# Patient Record
Sex: MALE | Race: WHITE | Employment: UNEMPLOYED
[De-identification: names, ages, dates, MRNs, and addresses within clinical notes are randomized per-mention and may not be internally consistent; named-entity substitution may affect disease eponyms.]

---

## 2017-12-17 ENCOUNTER — HEALTH MAINTENANCE LETTER (OUTPATIENT)
Age: 22
End: 2017-12-17

## 2024-11-03 ENCOUNTER — APPOINTMENT (OUTPATIENT)
Dept: RADIOLOGY | Facility: CLINIC | Age: 29
End: 2024-11-03
Payer: COMMERCIAL

## 2024-11-03 ENCOUNTER — OFFICE VISIT (OUTPATIENT)
Dept: URGENT CARE | Facility: CLINIC | Age: 29
End: 2024-11-03
Payer: COMMERCIAL

## 2024-11-03 VITALS
OXYGEN SATURATION: 98 % | WEIGHT: 154 LBS | RESPIRATION RATE: 20 BRPM | TEMPERATURE: 97.6 F | SYSTOLIC BLOOD PRESSURE: 153 MMHG | HEART RATE: 90 BPM | HEIGHT: 69 IN | BODY MASS INDEX: 22.81 KG/M2 | DIASTOLIC BLOOD PRESSURE: 100 MMHG

## 2024-11-03 DIAGNOSIS — S43.402A SPRAIN OF LEFT SHOULDER, UNSPECIFIED SHOULDER SPRAIN TYPE, INITIAL ENCOUNTER: ICD-10-CM

## 2024-11-03 DIAGNOSIS — M25.512 ACUTE PAIN OF LEFT SHOULDER: ICD-10-CM

## 2024-11-03 DIAGNOSIS — M25.512 ACUTE PAIN OF LEFT SHOULDER: Primary | ICD-10-CM

## 2024-11-03 PROCEDURE — G0382 LEV 3 HOSP TYPE B ED VISIT: HCPCS | Performed by: NURSE PRACTITIONER

## 2024-11-03 PROCEDURE — 73030 X-RAY EXAM OF SHOULDER: CPT

## 2024-11-03 RX ORDER — IBUPROFEN 600 MG/1
600 TABLET, FILM COATED ORAL EVERY 6 HOURS PRN
Qty: 28 TABLET | Refills: 0 | Status: SHIPPED | OUTPATIENT
Start: 2024-11-03 | End: 2024-11-10

## 2024-11-03 RX ORDER — CYCLOBENZAPRINE HCL 5 MG
5 TABLET ORAL 3 TIMES DAILY PRN
Qty: 21 TABLET | Refills: 0 | Status: SHIPPED | OUTPATIENT
Start: 2024-11-03 | End: 2024-11-10

## 2024-11-03 NOTE — PROGRESS NOTES
[facility]        NAME: Ivan Loo is a 28 y.o. male  : 1995    MRN: [id number]  DATE: November 3, 2024  TIME: 11:55 AM    Assessment and Plan   Acute pain of left shoulder [M25.512]  1. Acute pain of left shoulder  ibuprofen (MOTRIN) 600 mg tablet    cyclobenzaprine (FLEXERIL) 5 mg tablet    CANCELED: XR shoulder 2+ vw right      2. Sprain of left shoulder, unspecified shoulder sprain type, initial encounter  Comfort Arm Sling            Patient Instructions     Left arm sling placed with instructions for use.  Instructed not to use sling in place continuously.  Rehab step 1 instructions printed and provided to patient today.  Instructions for use provided.  Reviewed ibuprofen and Flexeril use.  Also reviewed rest ice and elevation with patient.  Information provided for orthopedics.  May follow-up with orthopedic doctor as needed for continued pain greater than 7 days.  Follow up with PCP in 3-5 days.  Proceed to  ER if symptoms worsen.    If tests have been performed at [facility], our office will contact you with results if changes need to be made to the care plan discussed with you at the visit.  [facility]    Chief Complaint     Chief Complaint   Patient presents with    Shoulder Injury     Patient's wife was playfully pulling his arm down when his left shoulder dislocated. Patient popped it back into place himself and has been controlling the pain with tylenol and motrin. Patient has minimal range of motion in his shoulder with a strong radial pulse.          History of Present Illness       2 days ago at approximately 2 AM patient was holding pack of cigarettes above wife's head and she reached for grabbing his left arm and pulling it down causing him to feel like he dislocated it.  He then proceeded to try to put it back into place.  Today he is here because he has increased pain when he raises his left arm above his shoulder.  He is unable  to raise his left arm above his shoulder level without assistance.  He does have trapezius muscle and upper back tenderness with muscle spasm noted along that area.  Is able to bend elbow and flex hand without any issues or concerns.  Is able to make a fist with strong grasp.  Positive empty can and is unable to do Apley's.        Review of Systems   Review of Systems   Constitutional: Negative.  Negative for fatigue and fever.   HENT: Negative.  Negative for congestion.    Eyes: Negative.  Negative for discharge and redness.   Respiratory: Negative.  Negative for shortness of breath.    Cardiovascular: Negative.  Negative for chest pain.   Gastrointestinal: Negative.  Negative for abdominal pain.   Endocrine: Negative.    Genitourinary: Negative.  Negative for difficulty urinating.   Musculoskeletal:  Positive for arthralgias and myalgias. Negative for gait problem.   Skin: Negative.  Negative for color change.   Neurological: Negative.  Negative for weakness.   Psychiatric/Behavioral: Negative.  Negative for behavioral problems.          Current Medications       Current Outpatient Medications:     cyclobenzaprine (FLEXERIL) 5 mg tablet, Take 1 tablet (5 mg total) by mouth 3 (three) times a day as needed for muscle spasms for up to 7 days, Disp: 21 tablet, Rfl: 0    ibuprofen (MOTRIN) 600 mg tablet, Take 1 tablet (600 mg total) by mouth every 6 (six) hours as needed for mild pain for up to 7 days, Disp: 28 tablet, Rfl: 0    Current Allergies     Allergies as of 11/03/2024    (No Known Allergies)            The following portions of the patient's history were reviewed and updated as appropriate: allergies, current medications, past family history, past medical history, past social history, past surgical history and problem list.     History reviewed. No pertinent past medical history.    History reviewed. No pertinent surgical history.    History reviewed. No pertinent family history.      Medications have been  "verified.        Objective   /100   Pulse 90   Temp 97.6 °F (36.4 °C) (Temporal)   Resp 20   Ht 5' 9\" (1.753 m)   Wt 69.9 kg (154 lb)   SpO2 98%   BMI 22.74 kg/m²   No LMP for male patient.       Physical Exam     Physical Exam  Vitals reviewed.   Constitutional:       Appearance: Normal appearance. He is well-developed and normal weight.   HENT:      Head: Normocephalic.      Right Ear: External ear normal.      Left Ear: External ear normal.      Nose: Nose normal.   Eyes:      Conjunctiva/sclera: Conjunctivae normal.      Pupils: Pupils are equal, round, and reactive to light.   Cardiovascular:      Rate and Rhythm: Normal rate.   Pulmonary:      Effort: Pulmonary effort is normal.   Abdominal:      General: Abdomen is flat.   Musculoskeletal:         General: Tenderness and signs of injury present. No swelling or deformity.      Right shoulder: Normal.      Left shoulder: Tenderness present. No swelling, deformity, effusion, laceration, bony tenderness or crepitus. Decreased range of motion. Normal strength. Normal pulse.      Right upper arm: Normal.      Left upper arm: Normal. No swelling, edema, deformity, tenderness or bony tenderness.      Left elbow: Normal.      Left forearm: Normal.      Left wrist: Normal.      Left hand: Normal.      Cervical back: Normal range of motion.   Skin:     General: Skin is warm and dry.   Neurological:      General: No focal deficit present.      Mental Status: He is alert and oriented to person, place, and time.   Psychiatric:         Mood and Affect: Mood normal.         Behavior: Behavior normal.         Thought Content: Thought content normal.         Judgment: Judgment normal.                   "